# Patient Record
(demographics unavailable — no encounter records)

---

## 2025-01-11 NOTE — PHYSICAL EXAM
[Well Developed] : well developed [Well Nourished] : well nourished [No Acute Distress] : no acute distress [Normal Conjunctiva] : normal conjunctiva [Normal Venous Pressure] : normal venous pressure [No Carotid Bruit] : no carotid bruit [Normal S1, S2] : normal S1, S2 [No Murmur] : no murmur [No Rub] : no rub [No Gallop] : no gallop [Clear Lung Fields] : clear lung fields back pain general [Good Air Entry] : good air entry [No Respiratory Distress] : no respiratory distress  [Soft] : abdomen soft [Non Tender] : non-tender [No Masses/organomegaly] : no masses/organomegaly [Normal Bowel Sounds] : normal bowel sounds [Normal Gait] : normal gait [No Edema] : no edema [No Cyanosis] : no cyanosis [No Clubbing] : no clubbing [No Varicosities] : no varicosities [No Rash] : no rash [No Skin Lesions] : no skin lesions [Moves all extremities] : moves all extremities [No Focal Deficits] : no focal deficits [Normal Speech] : normal speech [Alert and Oriented] : alert and oriented [Normal memory] : normal memory

## 2025-01-13 NOTE — ASSESSMENT
[FreeTextEntry1] : Follow up  HTN, Hx of mitral valve and tricuspid regurgitation, SVT  cont Valsartan-HCTZ 160-12.5mg daily cont Metoprolol ER 25 mg QD cont Amiodarone 200mg daily Reinforced the importance of following a low sodium diet follows with cardiology every 6 months - seen earlier today  Osteoporosis on Prolia injections follows with Rheumatology every 6 months  Rheumatoid arthritis cont Methotrexate 2.5 mg- 2 tabs in the a.m. and 1 tab qhs cont Folic Acid 1 mg daily follows with Rheumatology every 6 months  High risk for diabetes Reinforced the importance of following a low sugar/low carbohydrate diet we'll check A1c today  GERD Continue Famotidine 20 mg qhs.  Blood work ordered. Follow up in one week for lab results

## 2025-01-13 NOTE — HISTORY OF PRESENT ILLNESS
[de-identified] : Ms. CRISTELA TAVERA is a 83 year old female with Hx of HTN, GERD, mitral valve and tricuspid regurgitation, osteoporosis, and rheumatoid arthritis, who presents for a follow up visit.   Pt states she is feeling well, except for a little fatigue. Following with rheumatology every 6 months. States her RA is under control. Denies any SOB, CP, abdominal pain, N/V/D, headache, dizziness, or leg swelling. Reports compliance with taking her meds daily. Saw cardiology earlier today. Was instructed to continue current meds.

## 2025-01-13 NOTE — DISCUSSION/SUMMARY
[FreeTextEntry1] : The patient is an 83-year-old female GERD, moderate MR and TR, APC who is fatigued. #1 HTN- c/w valsartan 160/12.5mg #2 MR/TR- unchanged exam #3 SVT- 185 episodes on event monitor, c/w metoprolol 25mg bid, repeat event monitor 168 episodes, now sinus with amiodarone 200mg, TSH today  #3 GERD- c/w famotidine #4 General- c/w nutritional support to maintain weight. Received COVID vaccines. Emotional support provided [EKG obtained to assist in diagnosis and management of assessed problem(s)] : EKG obtained to assist in diagnosis and management of assessed problem(s)

## 2025-01-13 NOTE — PHYSICAL EXAM
[No Acute Distress] : no acute distress [Well Developed] : well developed [Well-Appearing] : well-appearing [Normal Sclera/Conjunctiva] : normal sclera/conjunctiva [Normal Outer Ear/Nose] : the outer ears and nose were normal in appearance [No JVD] : no jugular venous distention [No Lymphadenopathy] : no lymphadenopathy [Supple] : supple [No Respiratory Distress] : no respiratory distress  [No Accessory Muscle Use] : no accessory muscle use [Clear to Auscultation] : lungs were clear to auscultation bilaterally [Normal Rate] : normal rate  [Regular Rhythm] : with a regular rhythm [Normal S1, S2] : normal S1 and S2 [Pedal Pulses Present] : the pedal pulses are present [No Edema] : there was no peripheral edema [No Extremity Clubbing/Cyanosis] : no extremity clubbing/cyanosis [Soft] : abdomen soft [Non Tender] : non-tender [Non-distended] : non-distended [Normal Posterior Cervical Nodes] : no posterior cervical lymphadenopathy [Normal Anterior Cervical Nodes] : no anterior cervical lymphadenopathy [No CVA Tenderness] : no CVA  tenderness [No Spinal Tenderness] : no spinal tenderness [No Joint Swelling] : no joint swelling [Grossly Normal Strength/Tone] : grossly normal strength/tone [No Rash] : no rash [Coordination Grossly Intact] : coordination grossly intact [No Focal Deficits] : no focal deficits [Normal Gait] : normal gait [Normal Affect] : the affect was normal [Normal Insight/Judgement] : insight and judgment were intact [Normal Bowel Sounds] : normal bowel sounds [de-identified] : + murmur [de-identified] : Arthritic changes b/l hands

## 2025-01-13 NOTE — HISTORY OF PRESENT ILLNESS
[FreeTextEntry1] : Silvia continues to be anxious and tired. Eating a little better and weight stable. No HA, lightheadedness, dizziness or SOB.

## 2025-01-13 NOTE — ADDENDUM
[FreeTextEntry1] : Documented by Shirlene Nelson acting as a scribe for Dr. Consuelo Elaine. 01/13/2025   All medical record entries made by the scribe were at my, Dr. Consuelo Elaine, direction and personally dictated by me on 01/13/2025. I have reviewed the chart and agree that the record accurately reflects my personal performance of the history, physical exam, assessment and plan. I have also personally directed, reviewed, and agreed with the chart.

## 2025-01-13 NOTE — CARDIOLOGY SUMMARY
Pt's questions were answered.  Referrals to ENT and PT along with future labs and chest xray to be completed at [facility].    Pt will call [phone] to scheduled ENT appt.  He will have chest xray completed at [facility] today or tomorrow.  Pt  aware [facility] has  parking.    Pt appreciated the call.   [de-identified] : 1/13/25  sinus rhythm with frequent APCs

## 2025-01-13 NOTE — REVIEW OF SYSTEMS
[Weight Gain (___ Lbs)] : [unfilled] ~Ulb weight gain [Negative] : Heme/Lymph [Weight Loss (___ Lbs)] : no recent weight loss [SOB] : no shortness of breath [Dyspnea on exertion] : not dyspnea during exertion [Chest Discomfort] : no chest discomfort [Lower Ext Edema] : no extremity edema [Leg Claudication] : no intermittent leg claudication [Palpitations] : no palpitations [Orthopnea] : no orthopnea [PND] : no PND [Syncope] : no syncope

## 2025-01-13 NOTE — HISTORY OF PRESENT ILLNESS
[de-identified] : Ms. CRISTELA TAVERA is a 83 year old female with Hx of HTN, GERD, mitral valve and tricuspid regurgitation, osteoporosis, and rheumatoid arthritis, who presents for a follow up visit.   Pt states she is feeling well, except for a little fatigue. Following with rheumatology every 6 months. States her RA is under control. Denies any SOB, CP, abdominal pain, N/V/D, headache, dizziness, or leg swelling. Reports compliance with taking her meds daily. Saw cardiology earlier today. Was instructed to continue current meds.

## 2025-04-14 NOTE — REVIEW OF SYSTEMS
[Weight Loss (___ Lbs)] : no recent weight loss [SOB] : no shortness of breath [Dyspnea on exertion] : not dyspnea during exertion [Chest Discomfort] : no chest discomfort [Lower Ext Edema] : no extremity edema [Leg Claudication] : no intermittent leg claudication [Palpitations] : no palpitations [Orthopnea] : no orthopnea [PND] : no PND [Syncope] : no syncope

## 2025-04-14 NOTE — HISTORY OF PRESENT ILLNESS
[FreeTextEntry1] :  84-year-old female GERD, moderate MR and TR, APC who is fatigued. NOt very active. Occasional walk but not often. Very nervous when comes here.

## 2025-04-14 NOTE — PHYSICAL EXAM
[No Acute Distress] : no acute distress [Well-Appearing] : well-appearing [Normal Sclera/Conjunctiva] : normal sclera/conjunctiva [Normal Outer Ear/Nose] : the outer ears and nose were normal in appearance [No JVD] : no jugular venous distention [No Lymphadenopathy] : no lymphadenopathy [Supple] : supple [No Respiratory Distress] : no respiratory distress  [No Accessory Muscle Use] : no accessory muscle use [Clear to Auscultation] : lungs were clear to auscultation bilaterally [Normal Rate] : normal rate  [Regular Rhythm] : with a regular rhythm [Normal S1, S2] : normal S1 and S2 [No Edema] : there was no peripheral edema [Soft] : abdomen soft [Non Tender] : non-tender [Non-distended] : non-distended [Normal Bowel Sounds] : normal bowel sounds [Normal Posterior Cervical Nodes] : no posterior cervical lymphadenopathy [Normal Anterior Cervical Nodes] : no anterior cervical lymphadenopathy [No CVA Tenderness] : no CVA  tenderness [No Rash] : no rash [Normal Gait] : normal gait [Normal Affect] : the affect was normal [Normal Insight/Judgement] : insight and judgment were intact [de-identified] : + murmur [de-identified] : Arthritic changes b/l hands

## 2025-04-14 NOTE — ASSESSMENT
[FreeTextEntry1] : Follow up  Fatigue we'll check labs today  HTN, Hx of mitral valve and tricuspid regurgitation, SVT - initial BP elevated. repeat BP checked manually by me is  normal. Pt does reports being more nervous when she first came in cont Valsartan-HCTZ 160-12.5mg daily cont Metoprolol ER 25 mg QD cont Amiodarone 200mg daily Reinforced the importance of following a low sodium diet follows with cardiology every 6 months - seen earlier today  Osteoporosis on Prolia injections follows with Rheumatology every 6 months  Rheumatoid arthritis cont Methotrexate 2.5 mg- 2 tabs in the a.m. and 1 tab qhs cont Folic Acid 1 mg daily follows with Rheumatology every 6 months  GERD Continue Famotidine 20 mg qhs.  Blood work ordered. Follow up in one week for lab results

## 2025-04-14 NOTE — HISTORY OF PRESENT ILLNESS
[de-identified] : Ms. CRISTELA TAVERA is an 83-year-old female with hx. of HTN, GERD, mitral valve and tricuspid regurgitation, osteoporosis, and rheumatoid arthritis, who presents for follow up visit  Pt. states she is feeling "ok". Says she does feel tired and fatigue "once in a while" Reports compliance with taking her meds daily.

## 2025-04-14 NOTE — DISCUSSION/SUMMARY
[EKG obtained to assist in diagnosis and management of assessed problem(s)] : EKG obtained to assist in diagnosis and management of assessed problem(s) [FreeTextEntry1] : The patient is an 84-year-old female GERD, moderate MR and TR, APC who is fatigued and anxious. #1 HTN- c/w valsartan 160/12.5mg, start metoprolol 12.5mg when anxious #2 MR/TR- unchanged exam #3 SVT- 185 episodes on event monitor, repeat event monitor 168 episodes, c/w amiodarone 200mg and metoprolol 50mg, TSH today  #3 GERD- c/w famotidine #4 General- c/w nutritional support to maintain weight. Received COVID vaccines. Emotional support provided

## 2025-04-24 NOTE — ASSESSMENT
[FreeTextEntry1] : 85 y/o F w seropositive RA, OP, here for fu visit =pain in shoulders, elbows, hands, wrists, knees, ankles, feet =currently only pain in PIPs =prominence of PIPs vs joint swelling =in past on MTX =labs 2021 w CCP >200s ************************************************************************ =hx of L foot fx =hx of OP, on prolia DEXA scan reviewed 7/22 T score left femoral neck of -2.3  Will obtain inflammatory markers. Seems pt is active, given exam unless if PIPs are Chano's nodes.   Discussed OP, and left untreated, can have fragility fx that leads to morbidity and mortality. Recommend vitamin D 800 IU daily. Pt should do high impact exercise to improve OP.   Advised that prolia must be given in time, or BMD can worsen significantly.   Plan -Labs w serologies, inflammatory markers -obtain images, DEXA scan from [facility] radiology RTO depending on above results

## 2025-04-24 NOTE — PHYSICAL EXAM
[Auscultation Breath Sounds / Voice Sounds] : lungs were clear to auscultation bilaterally [Heart Sounds] : normal S1 and S2 [Heart Sounds Gallop] : no gallops [Murmurs] : no murmurs [Abdomen Soft] : soft [Abdomen Tenderness] : non-tender [Cervical Lymph Nodes Enlarged Posterior Bilaterally] : posterior cervical [Cervical Lymph Nodes Enlarged Anterior Bilaterally] : anterior cervical [Supraclavicular Lymph Nodes Enlarged Bilaterally] : supraclavicular [FreeTextEntry1] : PIP prominence vs swelling  [] : no rash [Skin Lesions] : no skin lesions [Oriented To Time, Place, And Person] : oriented to person, place, and time

## 2025-04-24 NOTE — HISTORY OF PRESENT ILLNESS
[FreeTextEntry1] : 83 y/o here to [facility]  Pt has hx of RA. Has symptoms in her 50s-60s.  Pt was having pain, stiffness, swelling in shoulders, elbows, hands, wrists, knees, ankles, feet.  Pt was put on MTX but stopped in 2022. However, pt has no pain, stiffness, swelling. Pt later reports she has PIP pain.   Pt reports in 2023 fell and had fracture L foot.  Pt had injection of prolia 3 years.   No fevers, h/a, rashes, hair loss, oral ulcers, epistaxis, sinusitis,  swollen glands, dry mouth, dry eyes, CP, SOB, cough, vision changes, abdominal pain, GERD, n/v/d, blood in stool or urine, focal weakness, sensory loss,  Raynaud's, joint pain, swelling, weight loss.

## 2025-04-24 NOTE — DATA REVIEWED
[FreeTextEntry1] : DEXA scan reviewed 7/22 T score left femoral neck of -2.3  Xrays reviewed 3/21 OA   Labs reviewed 2021